# Patient Record
Sex: MALE | Race: WHITE
[De-identification: names, ages, dates, MRNs, and addresses within clinical notes are randomized per-mention and may not be internally consistent; named-entity substitution may affect disease eponyms.]

---

## 2017-11-01 ENCOUNTER — HOSPITAL ENCOUNTER (EMERGENCY)
Dept: HOSPITAL 25 - UCEAST | Age: 42
Discharge: HOME | End: 2017-11-01
Payer: MEDICARE

## 2017-11-01 VITALS — SYSTOLIC BLOOD PRESSURE: 131 MMHG | DIASTOLIC BLOOD PRESSURE: 73 MMHG

## 2017-11-01 DIAGNOSIS — J01.90: Primary | ICD-10-CM

## 2017-11-01 PROCEDURE — 99211 OFF/OP EST MAY X REQ PHY/QHP: CPT

## 2017-11-01 PROCEDURE — G0463 HOSPITAL OUTPT CLINIC VISIT: HCPCS

## 2017-11-01 NOTE — UC
Respiratory Complaint HPI





- HPI Summary


HPI Summary: 





43 yo male presents with one wk hx of cough/post nasal drip/sinus pressure and 

fatigue


no f/c


no CP or SOB


no n/v/d








- History of Current Complaint


Chief Complaint: UCRespiratory


Stated Complaint: COUGH


Time Seen by Provider: 11/01/17 08:45


Hx Obtained From: Patient


Onset/Duration: Gradual Onset, Lasting Days


Timing: Constant


Severity Initially: Mild


Severity Currently: Moderate


Pain Intensity: 2


Pain Scale Used: 0-10 Numeric


Character: Cough: Nonproductive


Aggravating Factors: Nothing


Alleviating Factors: Nothing


Associated Signs And Symptoms: Positive: Nasal Congestion, Sinus Discomfort





- Allergies/Home Medications


Allergies/Adverse Reactions: 


 Allergies











Allergy/AdvReac Type Severity Reaction Status Date / Time


 


Loratadine [From Claritin] Allergy Severe Swelling Verified 10/11/16 16:46





   Of  





   Face,Lips,&  





   Throat  











Home Medications: 


 Home Medications





celeCOXIB CAP* [Celebrex CAP*] 200 mg PO DAILY 11/01/17 [History Confirmed 11/01 /17]











PMH/Surg Hx/FS Hx/Imm Hx


Previously Healthy: Yes - lupus/cerbral palsy


Other History Of: 


   Negative For: HIV, Hepatitis B, Hepatitis C, Anticoagulant Therapy





- Surgical History


Surgical History: Yes


Surgery Procedure, Year, and Place: Leg surgery-strecthing hamstrings





- Family History


Known Family History: Positive: None, Hypertension, Diabetes





- Social History


Alcohol Use: None


Substance Use Type: None


Smoking Status (MU): Never Smoked Tobacco





- Immunization History


Most Recent Influenza Vaccination: 2014/2015


Most Recent Tetanus Shot: 2011





Review of Systems


Constitutional: Fatigue


Skin: Negative


Eyes: Negative


ENT: Nasal Discharge, Sinus Congestion, Sinus Pain/Tenderness


Respiratory: Cough


Cardiovascular: Negative


Gastrointestinal: Negative


Genitourinary: Negative


Motor: Negative


Neurovascular: Negative


Musculoskeletal: Negative


Neurological: Negative


Psychological: Negative


Is Patient Immunocompromised?: No


All Other Systems Reviewed And Are Negative: Yes





Physical Exam


Triage Information Reviewed: Yes


Appearance: Well-Appearing, No Pain Distress, Well-Nourished


Vital Signs: 


 Initial Vital Signs











Temp  98.1 F   11/01/17 08:26


 


Pulse  86   11/01/17 08:26


 


Resp  18   11/01/17 08:26


 


BP  131/73   11/01/17 08:26


 


Pulse Ox  98   11/01/17 08:26











Vital Signs Reviewed: Yes


Eyes: Positive: Conjunctiva Clear


ENT: Positive: Hearing grossly normal, Nasal congestion, Nasal drainage, TM 

bulging, Hoarse voice, Sinus tenderness, Uvula midline.  Negative: TMs normal, 

TM dull, TM red, Tonsillar swelling, Tonsillar exudate, Muffled voice, Dental 

tenderness


Neck: Positive: Supple, Nontender, No Lymphadenopathy


Respiratory: Positive: Lungs clear, Normal breath sounds, No respiratory 

distress, No accessory muscle use


Cardiovascular: Positive: RRR, No Murmur


Musculoskeletal: Positive: Other: - using walker/atrophied LE with contractures


Neurological: Positive: Alert


Psychological Exam: Normal


Skin Exam: Normal





UC Diagnostic Evaluation





- Laboratory


O2 Sat by Pulse Oximetry: 98





Respiratory Course/Dx





- Differential Dx/Diagnosis


Provider Diagnoses: acute sinusitis





Discharge





- Discharge Plan


Condition: Stable


Disposition: HOME


Prescriptions: 


Amoxicillin PO (*) [Amoxicillin 875 MG (*)] 875 mg PO BID #20 tab


Patient Education Materials:  Sinusitis (ED)


Referrals: 


Nikolay Ortega MD [Primary Care Provider] - 5 Days (if not better)

## 2018-04-18 ENCOUNTER — HOSPITAL ENCOUNTER (EMERGENCY)
Dept: HOSPITAL 25 - UCEAST | Age: 43
Discharge: HOME | End: 2018-04-18
Payer: MEDICARE

## 2018-04-18 VITALS — SYSTOLIC BLOOD PRESSURE: 114 MMHG | DIASTOLIC BLOOD PRESSURE: 74 MMHG

## 2018-04-18 DIAGNOSIS — Z88.8: ICD-10-CM

## 2018-04-18 DIAGNOSIS — M32.9: ICD-10-CM

## 2018-04-18 DIAGNOSIS — G80.9: ICD-10-CM

## 2018-04-18 DIAGNOSIS — K52.9: Primary | ICD-10-CM

## 2018-04-18 PROCEDURE — 99212 OFFICE O/P EST SF 10 MIN: CPT

## 2018-04-18 PROCEDURE — G0463 HOSPITAL OUTPT CLINIC VISIT: HCPCS

## 2018-04-18 NOTE — XMS REPORT
Jac Casper

 Created on:2018



Patient:Jac Casper

Sex:Male

:1975

External Reference #:[id number]





Demographics







 Address  [address] [place] [address]

 

 Home Phone  [phone]

 

 Mobile Phone  [phone]

 

 Email Address  [email or web address]

 

 Preferred Language  English

 

 Marital Status  Not  Or 

 

 Yarsani Affiliation  Unknown

 

 Race  White

 

 Ethnic Group  Not  Or 









Author







 Organization  [email or web address]

 

 Address  [address] [address]

 

 Phone  [phone]









Support







 Name  Relationship  Address  Phone

 

 Caity Casper  Unavailable  Unavailable  [phone]









Care Team Providers







 Name  Role  Phone

 

 Nikolay Ortega MD  Care Team Information   Unavailable

 

 Nikolay Ortega MD  Primary Care Physician  Unavailable









Payers







 Type  Date  Identification Numbers  Payment Provider  Subscriber

 

 Medicare Primary    Policy Number: [id number]  Medicare  Jac Casper









 PayID: [id number]  [address]









 [place] [address]









 [facility]    Policy Number: [id number]  Medicaid  Jac Casper









 PayID: [id number]  [address]









 [place] [address]







Problems







 Description

 

 No Information







Family History







 Date  Family Member(s)  Problem(s)  Comments

 

   General  Arthritis  

 

   General  GF had autoimmune issues  







Social History







 Type  Date  Description  Comments

 

 ETOH Use    Denies alcohol use  

 

 Smoking    Patient has never smoked  

 

 Exercise Type/Frequency    Exercises rarely  







Allergies, Adverse Reactions, Alerts







 Date  Description  Reaction  Status  Severity  Comments

 

 2017  Claritin    active    hives

 

 2017  Zyrtec    active    ineffective







Medications







 Medication  Date  Status  Form  Strength  Qnty  SIG  Indications  Ordering



                 Provider

 

 Celebrex    Active  Capsules  200mg  180cap  take one  R76.0  Onesimo



   017        s  capsule/tab    Betzy,



             let daily    M.D.



             by mouth    



             twice daily    



             as needed    



             for pain,    



             avoid    



             ibuprofen    



             and other    



             nsaids    

 

 Multi Vitamin    Active  Tablets      1 a day    Unknown



   000              

 

 Vitamin D-3    Active  Capsules  1000Unit    1 by mouth    Unknown



   000          every day    

 

                 

 

 Voltaren    Hx  Gel  1%  200uni  apply 2  R76.0  Onesimo



   017 -        ts  grams twice    Betzy,



             daily as    M.DYudy



   017          needed for    



             pain to the    



             hands    

 

 Diclofenac  0  Hx  Tablets DR  75mg    take 1  R76.0  Unknown



 Sodium  000 -          tablet    



             twice a day    



   017          with food    







Vital Signs







 Date  Vital  Result  Comment

 

 2018  Height  67 inches  5'7"









 Weight  151.50 lb  

 

 Heart Rate  82 /min  

 

 BP Systolic Sitting  114 mmHg  

 

 BP Diastolic Sitting  72 mmHg  

 

 Respiratory Rate  14 /min  

 

 Body Temperature  98.4 F  

 

 Pain Level  0  

 

 BMI (Body Mass Index)  23.7 kg/m2  









 2017  Height  67 inches  5'7"









 Weight  152.00 lb  

 

 Heart Rate  100 /min  

 

 BP Systolic Sitting  130 mmHg  

 

 BP Diastolic Sitting  86 mmHg  

 

 Respiratory Rate  14 /min  

 

 BMI (Body Mass Index)  23.8 kg/m2  









 2017  Height  67 inches  5'7"









 Heart Rate  76 /min  

 

 BP Systolic Sitting  124 mmHg  

 

 BP Diastolic Sitting  70 mmHg  

 

 Respiratory Rate  14 /min  









 2017  Height  67 inches  5'7"









 Weight  141.00 lb  

 

 Heart Rate  82 /min  

 

 BP Systolic Sitting  121 mmHg  

 

 BP Diastolic Sitting  71 mmHg  

 

 Respiratory Rate  14 /min  

 

 Body Temperature  98.1 F  

 

 Pain Level  4  

 

 BMI (Body Mass Index)  22.1 kg/m2  







Results







 Description

 

 No Information







Procedures







 Description

 

 No Information







Encounters







 Type  Date  Location  Provider  CPT E/M  Dx

 

 Office Visit  2017  [facility]  Onesimo Bo M.D.  [id number]  
M06.4



   3:20p  Of Aiyana      









 M17.0

 

 Z79.899

 

 M79.1









 Office Visit  2017  3:20p  [facility]  Onesimo Bo  
[address]  R76.0



     Aiyana GAMA    









 M25.561

 

 M25.562

 

 M25.552

 

 M25.551









 Office Visit  2017  3:00p  [facility]  Onesimo Bo  
[address]  R76.0



     Aiyana GAMA    









 M79.1

 

 R20.8

 

 Z86.69







Plan of Care

2018 - Onesimo Bo M.D.M06.4 Inflammatory [id number] Other 
long term (current) drug therapyFollow up:F/u in 1 year or sooner if needed

## 2018-04-18 NOTE — UC
Abdominal Pain Male HPI





- HPI Summary


HPI Summary: 





PT HERE ACCOMPANIED BY MOM. ONSET OF WATERY DIARRHEA AND VOMITING LAST NIGHT. 

NO RECENT RAVEL OR NEW FOODS. NO RECENT ANTIBIOTICS OR CHANGE IN MEDS. NO 

FEVER. HAS HAD SOME MILD SINUS CONGESTION PAST FEW DAYS.  APPETITE IS DOWN. IS 

ABLE TO KEEP DOWN GINGERALE AND GATORADE.





- History of Current Complaint


Chief Complaint: UCGI


Stated Complaint: VOMITING


Time Seen by Provider: 04/18/18 15:40


Hx Obtained From: Patient, Family/Caretaker - MOM


Onset/Duration: Sudden Onset, Lasting Days - 1 DAY, Still Present


Severity Initially: Moderate


Severity Currently: Moderate


Pain Intensity: 0


Pain Scale Used: 0-10 Numeric


Location: Diffuse


Radiates: No


Character: Cramping


Aggravating Factor(s): Food


Alleviating Factor(s): Nothing


Associated Signs And Symptoms: Positive: Decreased Appetite, Nausea, Vomiting, 

Diarrhea.  Negative: Fever, Cough, Chest Pain, Back Pain, Constipation, Blood 

in Stool, Urinary Symptoms





- Allergies/Home Medications


Allergies/Adverse Reactions: 


 Allergies











Allergy/AdvReac Type Severity Reaction Status Date / Time


 


loratadine [From Claritin] Allergy  Swelling Verified 04/18/18 15:24





   Of  





   Face,Lips,&  





   Throat  














PMH/Surg Hx/FS Hx/Imm Hx





- Additional Past Medical History


Additional PMH: 





CEREBRAL PALSY, LUPUS


Other History Of: 


   Negative For: HIV, Hepatitis B, Hepatitis C, Anticoagulant Therapy





- Surgical History


Surgical History: Yes


Surgery Procedure, Year, and Place: Leg surgery-strecthing hamstrings





- Family History


Known Family History: Positive: Hypertension, Diabetes





- Social History


Alcohol Use: None


Substance Use Type: None


Smoking Status (MU): Never Smoked Tobacco





- Immunization History


Most Recent Influenza Vaccination: 2014/2015


Most Recent Tetanus Shot: 2011





Review of Systems


Constitutional: Negative


Respiratory: Negative


Cardiovascular: Negative


Gastrointestinal: Vomiting, Diarrhea, Nausea


Genitourinary: Negative


All Other Systems Reviewed And Are Negative: Yes





Physical Exam


Triage Information Reviewed: Yes


Appearance: Well-Appearing, No Pain Distress, Well-Nourished


Vital Signs: 


 Initial Vital Signs











Temp  99.8 F   04/18/18 15:19


 


Pulse  115   04/18/18 15:19


 


Resp  20   04/18/18 15:19


 


BP  114/74   04/18/18 15:19


 


Pulse Ox  97   04/18/18 15:19











Vital Signs Reviewed: Yes


Eyes: Positive: Conjunctiva Clear


ENT: Positive: Hearing grossly normal


Neck: Positive: Supple


Respiratory Exam: Normal


Cardiovascular Exam: Normal


Abdomen Description: Positive: Nontender, Soft.  Negative: Distended, Guarding


Bowel Sounds: Positive: Present


Musculoskeletal: Positive: No Edema


Neurological: Positive: Alert


Psychological: Positive: Normal Response To Family, Age Appropriate Behavior


Skin: Negative: rashes





Abd Pain Male Course/Dx





- Differential Dx/Clinical Impression


Provider Diagnoses: ACUTE GASTROENTERITIS





Discharge





- Sign-Out/Discharge


Documenting (check all that apply): Discharge





- Discharge Plan


Condition: Stable


Disposition: HOME


Prescriptions: 


Ondansetron ODT TAB* [Zofran Odt TAB*] 4 mg PO Q6H PRN #20 tab.odt


 PRN Reason: Nausea/Vomiting


Patient Education Materials:  Gastroenteritis (ED)


Referrals: 


Nikolay Ortega MD [Primary Care Provider] - If Needed


Additional Instructions: 


YOUR SYMPTOMS ARE CONSISTENT WITH AN ACUTE VIRAL GASTROENTERITIS AND WILL 

LIKELY RESOLVE OVER THE NEXT SEVERAL DAYS. 





ENSURE ADEQUATE HYDRATION. CLEAR LIQUIDS, BLAND DIET. AVOID CAFFEINE, DAIRY, 

GREASY, SPICY FOODS. ONCE YOU ARE TOLERATING CLEAR LIQUIDS YOU CAN ADVANCE TO 

SIMPLE, BLAND FOODS.





[place] [facility] [place] IF YOU ARE UNABLE TO KEEP DOWN ANYTHING BY MOUTH. 





SEEK REEVALUATION IF YOUR SYMPTOMS ARE NOT IMPROVING AS EXPECTED OVER THE NEXT 5

-7 DAYS.





GASTROENTERITIS:


     You have gastroenteritis ("intestinal flu").  This disease is usually 

caused by a virus.  There is no specific treatment.  The disease will end by 

itself.  For now, the main danger is dehydration.


     Give clear liquids. Examples include Pedialyte, Gatorade, clear broth, 

juices, flat sodas, and jello water. Medications may be prescribed by the 

physician for special cases. Once tolerated, the clear liquid diet may be 

supplemented with rice, cereal, toast, applesauce, or bananas.


     Call the physician or go to the hospital if vomiting increases or blood 

appears in the bowel movement or vomitus; if you fail to improve, or if signs 

of dehydration occur (tongue and mouth become dry, lethargy).

















- Billing Disposition and Condition


Condition: STABLE


Disposition: HOME

## 2018-07-12 ENCOUNTER — HOSPITAL ENCOUNTER (EMERGENCY)
Dept: HOSPITAL 25 - UCEAST | Age: 43
Discharge: HOME | End: 2018-07-12
Payer: MEDICARE

## 2018-07-12 VITALS — DIASTOLIC BLOOD PRESSURE: 76 MMHG | SYSTOLIC BLOOD PRESSURE: 143 MMHG

## 2018-07-12 DIAGNOSIS — L03.113: Primary | ICD-10-CM

## 2018-07-12 DIAGNOSIS — Z88.8: ICD-10-CM

## 2018-07-12 PROCEDURE — G0463 HOSPITAL OUTPT CLINIC VISIT: HCPCS

## 2018-07-12 PROCEDURE — 99212 OFFICE O/P EST SF 10 MIN: CPT

## 2018-07-12 NOTE — UC
Hand/Wrist HPI





- HPI Summary


HPI Summary: 





44 yo male presents with right hand pain and swelling since last night. He 

lives at [facility] and is here with a caretaker today. Pt is a poor historian

, the bulk of the HPI is provided by the caretaker with him today. Last night 

noticed right hand pain and swelling - applied ice with mild relief. This 

morning seems a little worse. Unknown if he was bitten by any insect or if he 

had an injury. 





- History Of Current Complaint


Chief Complaint: UCUpperExtremity


Stated Complaint: SWOLLEN HAND


Time Seen by Provider: 07/12/18 13:45


Hx Obtained From: Patient


Onset/Duration: Sudden Onset


Severity Initially: Moderate


Severity Currently: Moderate


Pain Intensity: 6


Pain Scale Used: 0-10 Numeric





- Allergies/Home Medications


Allergies/Adverse Reactions: 


 Allergies











Allergy/AdvReac Type Severity Reaction Status Date / Time


 


loratadine [From Claritin] Allergy  Swelling Verified 07/12/18 13:43





   Of  





   Face,Lips,&  





   Throat  














PMH/Surg Hx/FS Hx/Imm Hx





- Additional Past Medical History


Additional PMH: 





Disabled


Previously Healthy: Yes


Other History Of: 


   Negative For: HIV, Hepatitis B, Hepatitis C, Anticoagulant Therapy





- Surgical History


Surgical History: Yes


Surgery Procedure, Year, and Place: Leg surgery-strecthing hamstrings





- Family History


Known Family History: Positive: None, Hypertension, Diabetes





- Social History


Occupation: Disabled


Lives: Assisted Living


Alcohol Use: None


Substance Use Type: None


Smoking Status (MU): Never Smoked Tobacco





- Immunization History


Most Recent Influenza Vaccination: 2014/2015


Most Recent Tetanus Shot: 2011





Review of Systems


Constitutional: Negative


Skin: Negative


Respiratory: Negative


Cardiovascular: Negative


Musculoskeletal: Other: - right hand pain


Neurological: Negative


Psychological: Negative


All Other Systems Reviewed And Are Negative: Yes





Physical Exam





- Summary


Physical Exam Summary: 





GENERAL: NAD. WDWN. No pain distress.


SKIN: Right hand: Very mild erythema and edema about dorsal right hand. No 

streaking, bleeding, or drainage.


NECK: Supple. Nontender. No lymphadenopathy. 


CHEST:  No accessory muscle use. Breathing comfortably and in no distress.


CV:  Pulses intact


MSK: RIGHT HAND and finger: Mild TTP over 3rd MCP. FROM. Strength 5/5 including 

 strength. 


NEURO: Alert. 


PSYCH: Age appropriate behavior.





Triage Information Reviewed: Yes


Vital Signs: 


 Initial Vital Signs











Temp  98.9 F   07/12/18 13:40


 


Pulse  89   07/12/18 13:40


 


Resp  18   07/12/18 13:40


 


BP  143/76   07/12/18 13:40


 


Pulse Ox  99   07/12/18 13:40














Hand/Wrist Course/Dx





- Course


Course Of Treatment: XR: IMPRESSION: No fracture of the right hand is noted.  

Suspect cellulitis - will place on keflex and advice RICE therapy. F/u if 

persists or worsens.





- Differential Dx/Diagnosis


Provider Diagnoses: Cellulitis right hand





Discharge





- Sign-Out/Discharge


Documenting (check all that apply): Patient Departure





- Discharge Plan


Condition: Stable


Disposition: HOME


Prescriptions: 


Cephalexin CAP* [Keflex CAP*] 500 mg PO BID #14 cap


Patient Education Materials:  Cellulitis (ED)


Referrals: 


Nikolay Ortega MD [Primary Care Provider] - 


Additional Instructions: 


If you develop a fever, shortness of breath, chest pain, new or worsening 

symptoms - please call your PCP or go to the ED.


 





Your blood pressure was slightly elevated at todays visit. Please see your 

primary provider within 4 weeks for recheck and re-evaluation.








1) Rest, elevate, and apply ice to the hand to reduce pain and swelling





- Billing Disposition and Condition


Condition: STABLE


Disposition: Home

## 2018-07-12 NOTE — RAD
Indication: Right hand injury.



4 views of the right hand are reviewed. There is no fracture or dislocation. No other bone

or joint pathology is identified.



IMPRESSION: No fracture of the right hand is noted.

## 2020-01-27 ENCOUNTER — HOSPITAL ENCOUNTER (EMERGENCY)
Dept: HOSPITAL 25 - UCEAST | Age: 45
Discharge: HOME | End: 2020-01-27
Payer: MEDICARE

## 2020-01-27 VITALS — SYSTOLIC BLOOD PRESSURE: 140 MMHG | DIASTOLIC BLOOD PRESSURE: 87 MMHG

## 2020-01-27 DIAGNOSIS — J39.2: ICD-10-CM

## 2020-01-27 DIAGNOSIS — Z88.8: ICD-10-CM

## 2020-01-27 DIAGNOSIS — J32.9: Primary | ICD-10-CM

## 2020-01-27 LAB
FLUAV RNA SPEC QL NAA+PROBE: NEGATIVE
FLUBV RNA SPEC QL NAA+PROBE: NEGATIVE

## 2020-01-27 PROCEDURE — 99212 OFFICE O/P EST SF 10 MIN: CPT

## 2020-01-27 PROCEDURE — G0463 HOSPITAL OUTPT CLINIC VISIT: HCPCS

## 2020-01-27 NOTE — UC
Throat Pain/Nasal Misbah HPI





- HPI Summary


HPI Summary: 


44-year-old male comes in with chief complaint of upper respiratory tract 

infection symptoms and possible fever.  More than a week ago patient had upper 

respiratory tract infection symptoms and over time they did improve.  Today 

plan patient was at his  it was reported to the patient's mother that he 

had a fever.  Patient does have special needs and is minimally verbal.  They 

have been using over-the-counter medications.





- History of Current Complaint


Chief Complaint: UCRespiratory


Stated Complaint: FEVER


Time Seen by Provider: 01/27/20 17:42


Pain Intensity: 3





- Allergies/Home Medications


Allergies/Adverse Reactions: 


 Allergies











Allergy/AdvReac Type Severity Reaction Status Date / Time


 


loratadine [From Claritin] Allergy  Swelling Verified 01/27/20 17:34





   Of  





   Face,Lips,&  





   Throat  











Home Medications: 


 Home Medications





Chlorphenir/Phenyleph/Aspirin [Sophia-Gaylord Plus Cold Tab Eff] 1 dose PO ONCE 

PRN 01/27/20 [History Confirmed 01/27/20]











PMH/Surg Hx/FS Hx/Imm Hx


Previously Healthy: Yes - special needs,


Other History Of: 


   Negative For: HIV, Hepatitis B, Hepatitis C, Anticoagulant Therapy





- Surgical History


Surgical History: Yes


Surgery Procedure, Year, and Place: Leg surgery-strecthing hamstrings





- Family History


Known Family History: Positive: None, Hypertension, Diabetes





- Social History


Alcohol Use: None


Substance Use Type: None


Smoking Status (MU): Never Smoked Tobacco





- Immunization History


Most Recent Influenza Vaccination: 2014/2015


Most Recent Tetanus Shot: 2011





Review of Systems


All Other Systems Reviewed And Are Negative: Yes


Constitutional: Positive: Fever


Skin: Positive: Negative


Eyes: Positive: Negative


ENT: Positive: Sore Throat, Ear Ache, Nasal Discharge, Sinus Congestion


Respiratory: Positive: Cough


Cardiovascular: Positive: Negative


Gastrointestinal: Positive: Negative


Motor: Positive: Negative


Neurovascular: Positive: Negative


Musculoskeletal: Positive: Negative


Neurological: Positive: Negative


Psychological: Positive: Negative


Is Patient Immunocompromised?: No





Physical Exam


Triage Information Reviewed: Yes


Appearance: Well-Appearing, No Pain Distress, Well-Nourished


Vital Signs: 


 Initial Vital Signs











Temp  99.6 F   01/27/20 17:31


 


Pulse  93   01/27/20 17:31


 


Resp  18   01/27/20 17:31


 


BP  140/87   01/27/20 17:31


 


Pulse Ox  99   01/27/20 17:31











Vital Signs Reviewed: Yes


Eye Exam: Normal


Eyes: Positive: Conjunctiva Clear


ENT: Positive: Pharyngeal erythema, Nasal congestion, Nasal drainage, TMs normal


Neck: Positive: Supple


Respiratory: Positive: Lungs clear, Normal breath sounds, No respiratory 

distress


Cardiovascular: Positive: RRR


Musculoskeletal: Positive: Strength Intact, ROM Intact


Neurological: Positive: Alert, Muscle Tone Normal


Psychological: Positive: Normal Response To Family


Skin Exam: Normal





Throat Pain/Nasal Course/Dx





- Course


Course Of Treatment: 


Overall the patient's symptoms been going on for 10 days and therefore we'll 

treat with amoxicillin.  Will continue symptomatic treatment also.





- Differential Dx/Diagnosis


Provider Diagnosis: 


 Sinusitis








Discharge ED





- Sign-Out/Discharge


Documenting (check all that apply): Patient Departure


All imaging exams completed and their final reports reviewed: No Studies





- Discharge Plan


Condition: Stable


Disposition: HOME


Prescriptions: 


Amoxicillin PO (*) [Amoxicillin 875 MG (*)] 875 mg PO BID #20 tab


Patient Education Materials:  Sinusitis (ED)


Referrals: 


Nikolay Ortega MD [Primary Care Provider] - 


Additional Instructions: 


FOLLOW UP WITH YOUR DOCTOR IF NOT COMPLETELY IMPROVED.


GET REEVALUATED SOONER IF NOT IMPROVED OR WORSE OR ANY QUESTIONS OR CONCERNS.








- Billing Disposition and Condition


Condition: STABLE


Disposition: Home

## 2020-03-19 ENCOUNTER — HOSPITAL ENCOUNTER (EMERGENCY)
Dept: HOSPITAL 25 - UCEAST | Age: 45
Discharge: HOME | End: 2020-03-19
Payer: MEDICARE

## 2020-03-19 VITALS — SYSTOLIC BLOOD PRESSURE: 143 MMHG | DIASTOLIC BLOOD PRESSURE: 77 MMHG

## 2020-03-19 DIAGNOSIS — G80.9: ICD-10-CM

## 2020-03-19 DIAGNOSIS — Z88.8: ICD-10-CM

## 2020-03-19 DIAGNOSIS — L25.9: ICD-10-CM

## 2020-03-19 DIAGNOSIS — H10.32: Primary | ICD-10-CM

## 2020-03-19 DIAGNOSIS — M32.9: ICD-10-CM

## 2020-03-19 PROCEDURE — 99212 OFFICE O/P EST SF 10 MIN: CPT

## 2020-03-19 PROCEDURE — G0463 HOSPITAL OUTPT CLINIC VISIT: HCPCS

## 2020-03-19 NOTE — UC
Eye Complaint HPI





- HPI Summary


HPI Summary: 


CHIEF COMPLAINT: left eye redness


HPI: This is a 45-year-old male with a history of cerebral palsy and lupus who 

complains of left skin and eye redness beginning this morning.  The patient is 

cared for by his mother.  They deny discharge or pain.  


Description of Pain:none.





VITAL SIGNS REVIEWED. Within normal limits unless noted here.


NURSES NOTE REVIEWED. "left eye swelling, when pt woke up this am left eye was 

swollen shut, redness noted around eye with swelling. sypmtoms started this am. 

no drainage noted per pt and mom. no drng noted at this assessment. per pt mom 

pt does have a non productive cough. pt has hx allergies.   "











- History of Current Complaint


Chief Complaint: UCEye


Stated Complaint: LEFT EYE SWELLING


Time Seen by Provider: 03/19/20 09:04


Hx Obtained From: Patient


Pain Intensity: 0





- Allergies/Home Medications


Allergies/Adverse Reactions: 


 Allergies











Allergy/AdvReac Type Severity Reaction Status Date / Time


 


loratadine [From Claritin] Allergy  Swelling Verified 03/19/20 09:02





   Of  





   Face,Lips,&  





   Throat  











Home Medications: 


 Home Medications





celeCOXIB CAP* [Celebrex CAP*] 200 mg PO DAILY 11/01/17 [History Confirmed 01/27 /20]


Erythromycin OPTH OINT* [Erythromycin 0.5% OPTH OINT*] 1 applic LEFT EYE TID #1 

ophth.oint MDD 3 times a day 03/19/20 [Rx]


Multivitamin [Multivitamins] 1 cap PO DAILY 03/19/20 [History Confirmed 03/19/20

]











PMH/Surg Hx/FS Hx/Imm Hx





- Additional Past Medical History


Additional PMH: 





PAST MEDICAL HISTORY- 


CHRONIC and RECURRENT HEALTH PROBLEM LIST REVIEWED.


Information relevant to present complaint: cerebral palsy; lupus.


VISIT HISTORY REVIEWED. 


MEDICATIONS & ALLERGIES REVIEWED.


HYPERTENSION STATUS: no current treatment.


FAMILY HISTORY: 


Positive for:   cancer.  





SOCIAL HISTORY:  


Smoker: no   


Home: with mother


Employment: none currently


Previously Healthy: No


Other History Of: 


   Negative For: HIV, Hepatitis B, Hepatitis C, Anticoagulant Therapy





- Surgical History


Surgical History: Yes


Surgery Procedure, Year, and Place: Leg surgery-strecthing hamstrings





- Family History


Known Family History: Positive: None, Hypertension, Diabetes





- Social History


Alcohol Use: None


Substance Use Type: None


Smoking Status (MU): Never Smoked Tobacco





- Immunization History


Most Recent Influenza Vaccination: 2014/2015


Most Recent Tetanus Shot: 2011





Review of Systems


All Other Systems Reviewed And Are Negative: Yes


Eyes: Positive: Other - periorbital left eye redness


Respiratory: Positive: Negative


Cardiovascular: Positive: Negative


Gastrointestinal: Positive: Negative


Is Patient Immunocompromised?: No





Physical Exam





- Summary


Physical Exam Summary: 


Appearance: The patient is well-appearing, is in no pain or distress, and is 

well-nourished.


Eyes:  Examination of the right eye is normal.  Examination of the left eye 

shows slight scleral injection.  There is mild chemosis of the lower lid.  

There is scaling rash over the lateral aspect of the zygoma on the left there 

is no evidence of cellulitis.  There is no evidence of periorbital infection. 

Pupils are equal and reactive to light and accommodation.  Extra ocular muscle 

movement is intact.


ENT: The hearing is grossly normal, the pharynx is normal, and the TMs are 

normal. There is no muffled or hoarse voice.  No stridor.


Neck: The neck is supple and there is no lymphadenopathy.


Respiratory: The chest is non-tender to palpation and without crepitus. The 

lungs are clear, there are normal breath sounds, and there is no respiratory 

distress.  No wheezes, rales or rhonchi.


Cardiovascular: Heart sounds reveal a regular rate and rhythm. There are no 

clicks, rubs or murmurs.  There are no carotid bruits or thrills.  Circulation 

is grossly intact.


Abdomen: The abdomen is soft and nontender. There is no organomegaly.  Bowel 

sounds are present and within normal limits.  No point tenderness at McBurneys 

point. No CVA tenderness.


Musculoskeletal: Strength is intact. The patient moves all extremities.  


Neurological: The patient is alert. Motor and sensory are examination grossly 

intact.  Speech is normal.


Psychological: The patient displays age appropriate behavior, and is 

conversant. GCS=15.


Skin: Negative for rashes.











Triage Information Reviewed: Yes


Vital Signs: 


 Initial Vital Signs











Temp  98.9 F   03/19/20 08:54


 


Pulse  90   03/19/20 08:54


 


Resp  18   03/19/20 08:54


 


BP  143/77   03/19/20 08:54


 


Pulse Ox  98   03/19/20 08:54











Vital Signs Reviewed: Yes





Eye Complaint Course/Dx





- Course


Course Of Treatment: 


This is a 45-year-old male with a history of cerebral palsy and lupus who 

complains of left skin and eye redness beginning this morning.  The patient is 

cared for by his mother.  They deny discharge or pain.  Physical examination 

shows slightly injected left sclera.  The skin to the lateral aspect of the 

left eye is slightly scaly that would be consistent with a seborrheic 

dermatitis or a mild localized skin irritation or contact dermatitis.  I 

discussed these conditions with the patient's mother.  I will start him on 

erythromycin ointment for a possible early conjunctivitis because of the 

history of discharge reported by the mother.  I also recommend that they use 1% 

hydrocortisone cream twice a day for 5-7 days to the area of skin near the left 

eye and that the patient is careful about putting any irritants on the skin.  

The patient's mother voiced understanding of the diagnosis and treatment 

instructions.








- Differential Dx/Diagnosis


Differential Diagnosis/HQI/PQRI: Conjunctivitis, Orbital Cellulitis, Other - 

contact dermatitis


Provider Diagnosis: 


 Conjunctivitis, Contact dermatitis








Discharge ED





- Sign-Out/Discharge


Documenting (check all that apply): Patient Departure


All imaging exams completed and their final reports reviewed: No Studies





- Discharge Plan


Condition: Stable


Disposition: HOME


Prescriptions: 


Erythromycin OPTH OINT* [Erythromycin 0.5% OPTH OINT*] 1 applic LEFT EYE TID #1 

ophth.oint MDD 3 times a day


Referrals: 


Nikolay Ortega MD [Primary Care Provider] - 


Additional Instructions: 


AS WE DISCUSSED: 


PLEASE SEEK CARE AT THE EMERGENCY DEPARTMENT IF SYMPTOMS WORSEN OR IF NEW 

SYMPTOMS DEVELOP. 


FOLLOW UP WITH YOUR PRIMARY CARE PHYSICIAN IF CONDITION CONTINUES BEYOND 3 DAYS 

WITHOUT IMPROVEMENT.


YOUR DIAGNOSIS IS:left eye conjunctivitis; localized skin irritation or contact 

dermatitis of the face.


YOUR PRESCRIPTION RECOMMENDATION IS:erythromycin ophthalmic ointment.years 3 

times a day for 5-7 days; Stop if eye suddenly becomes red; recheck if your eye 

is not better in 5 days or any time if it's getting worse.


OTHER INSTRUCTIONS:use hydrocortisone cream 1% to the area of skin near the 

eye.  Avoid any irritants.  Use twice a day for 3-7 days or whenever you notice 

irritation.


Hypertension Discharge Instructions:


Your blood pressure reading today was 143/77, indicating HYPERTENSION. Follow-

up with your primary care provider within 4 weeks for blood pressure check and 

appropriate recommendations and treatment, as needed. 


FOR PAIN AND/OR SLEEP:


For pain:  Ibuprofen (Motrin and other brand names) 400-600mg PLUS 

acetaminophen (Tylenol and other brand names) 500mg - 1000mg every 8 hours.

















- Billing Disposition and Condition


Condition: STABLE


Disposition: Home